# Patient Record
(demographics unavailable — no encounter records)

---

## 2025-05-20 NOTE — HISTORY OF PRESENT ILLNESS
[Patient reported colonoscopy was normal] : Patient reported colonoscopy was normal [FreeTextEntry1] : 58 yo  presents for an annual GYN exam w/o complaints. Pt denies any PMB or abnormal discharge. Pt following with breast surgeon [Mammogramdate] : 04/25 [BreastSonogramDate] : 04/25 [PapSmeardate] : 2024 [BoneDensityDate] : 2024 [ColonoscopyDate] : 2024

## 2025-05-20 NOTE — PHYSICAL EXAM
[Chaperone Declined] : Chaperone offered however refused by patient, [Appropriately responsive] : appropriately responsive [Alert] : alert [No Acute Distress] : no acute distress [No Lymphadenopathy] : no lymphadenopathy [Soft] : soft [Non-tender] : non-tender [Non-distended] : non-distended [No HSM] : No HSM [No Lesions] : no lesions [No Mass] : no mass [Oriented x3] : oriented x3 [Examination Of The Breasts] : a normal appearance [No Discharge] : no discharge [No Masses] : no breast masses were palpable [Labia Majora] : normal [Labia Minora] : normal [Dry Mucosa] : dry mucosa [No Bleeding] : There was no active vaginal bleeding [Normal] : normal [Uterine Adnexae] : normal